# Patient Record
Sex: FEMALE | Race: WHITE | NOT HISPANIC OR LATINO | URBAN - METROPOLITAN AREA
[De-identification: names, ages, dates, MRNs, and addresses within clinical notes are randomized per-mention and may not be internally consistent; named-entity substitution may affect disease eponyms.]

---

## 2017-11-23 ENCOUNTER — EMERGENCY (EMERGENCY)
Facility: HOSPITAL | Age: 37
LOS: 1 days | Discharge: HOME | End: 2017-11-23
Attending: EMERGENCY MEDICINE | Admitting: EMERGENCY MEDICINE
Payer: COMMERCIAL

## 2017-11-23 VITALS
SYSTOLIC BLOOD PRESSURE: 126 MMHG | RESPIRATION RATE: 16 BRPM | HEART RATE: 84 BPM | DIASTOLIC BLOOD PRESSURE: 87 MMHG | OXYGEN SATURATION: 100 %

## 2017-11-23 LAB
ALBUMIN SERPL ELPH-MCNC: 4.1 G/DL — SIGNIFICANT CHANGE UP (ref 3.3–5)
ALP SERPL-CCNC: 51 U/L — SIGNIFICANT CHANGE UP (ref 40–120)
ALT FLD-CCNC: 17 U/L RC — SIGNIFICANT CHANGE UP (ref 10–45)
ANION GAP SERPL CALC-SCNC: 13 MMOL/L — SIGNIFICANT CHANGE UP (ref 5–17)
APPEARANCE UR: CLEAR — SIGNIFICANT CHANGE UP
AST SERPL-CCNC: 20 U/L — SIGNIFICANT CHANGE UP (ref 10–40)
BACTERIA # UR AUTO: ABNORMAL /HPF
BASOPHILS # BLD AUTO: 0 K/UL — SIGNIFICANT CHANGE UP (ref 0–0.2)
BASOPHILS NFR BLD AUTO: 0.2 % — SIGNIFICANT CHANGE UP (ref 0–2)
BILIRUB SERPL-MCNC: 0.2 MG/DL — SIGNIFICANT CHANGE UP (ref 0.2–1.2)
BILIRUB UR-MCNC: NEGATIVE — SIGNIFICANT CHANGE UP
BUN SERPL-MCNC: 7 MG/DL — SIGNIFICANT CHANGE UP (ref 7–23)
CALCIUM SERPL-MCNC: 9.2 MG/DL — SIGNIFICANT CHANGE UP (ref 8.4–10.5)
CHLORIDE SERPL-SCNC: 103 MMOL/L — SIGNIFICANT CHANGE UP (ref 96–108)
CO2 SERPL-SCNC: 24 MMOL/L — SIGNIFICANT CHANGE UP (ref 22–31)
COLOR SPEC: COLORLESS — SIGNIFICANT CHANGE UP
CREAT SERPL-MCNC: 0.54 MG/DL — SIGNIFICANT CHANGE UP (ref 0.5–1.3)
DIFF PNL FLD: NEGATIVE — SIGNIFICANT CHANGE UP
EOSINOPHIL # BLD AUTO: 0.3 K/UL — SIGNIFICANT CHANGE UP (ref 0–0.5)
EOSINOPHIL NFR BLD AUTO: 2.7 % — SIGNIFICANT CHANGE UP (ref 0–6)
EPI CELLS # UR: SIGNIFICANT CHANGE UP /HPF
GLUCOSE SERPL-MCNC: 92 MG/DL — SIGNIFICANT CHANGE UP (ref 70–99)
GLUCOSE UR QL: NEGATIVE — SIGNIFICANT CHANGE UP
HCG SERPL-ACNC: SIGNIFICANT CHANGE UP MIU/ML
HCT VFR BLD CALC: 34.7 % — SIGNIFICANT CHANGE UP (ref 34.5–45)
HGB BLD-MCNC: 12.3 G/DL — SIGNIFICANT CHANGE UP (ref 11.5–15.5)
HYALINE CASTS # UR AUTO: ABNORMAL
KETONES UR-MCNC: NEGATIVE — SIGNIFICANT CHANGE UP
LEUKOCYTE ESTERASE UR-ACNC: NEGATIVE — SIGNIFICANT CHANGE UP
LYMPHOCYTES # BLD AUTO: 2.3 K/UL — SIGNIFICANT CHANGE UP (ref 1–3.3)
LYMPHOCYTES # BLD AUTO: 23.4 % — SIGNIFICANT CHANGE UP (ref 13–44)
MCHC RBC-ENTMCNC: 34.2 PG — HIGH (ref 27–34)
MCHC RBC-ENTMCNC: 35.3 GM/DL — SIGNIFICANT CHANGE UP (ref 32–36)
MCV RBC AUTO: 96.8 FL — SIGNIFICANT CHANGE UP (ref 80–100)
MONOCYTES # BLD AUTO: 0.5 K/UL — SIGNIFICANT CHANGE UP (ref 0–0.9)
MONOCYTES NFR BLD AUTO: 5.6 % — SIGNIFICANT CHANGE UP (ref 2–14)
NEUTROPHILS # BLD AUTO: 6.6 K/UL — SIGNIFICANT CHANGE UP (ref 1.8–7.4)
NEUTROPHILS NFR BLD AUTO: 68.1 % — SIGNIFICANT CHANGE UP (ref 43–77)
NITRITE UR-MCNC: NEGATIVE — SIGNIFICANT CHANGE UP
PH UR: 6.5 — SIGNIFICANT CHANGE UP (ref 5–8)
PLATELET # BLD AUTO: 212 K/UL — SIGNIFICANT CHANGE UP (ref 150–400)
POTASSIUM SERPL-MCNC: 3.4 MMOL/L — LOW (ref 3.5–5.3)
POTASSIUM SERPL-SCNC: 3.4 MMOL/L — LOW (ref 3.5–5.3)
PROT SERPL-MCNC: 6.9 G/DL — SIGNIFICANT CHANGE UP (ref 6–8.3)
PROT UR-MCNC: NEGATIVE — SIGNIFICANT CHANGE UP
RBC # BLD: 3.58 M/UL — LOW (ref 3.8–5.2)
RBC # FLD: 11.6 % — SIGNIFICANT CHANGE UP (ref 10.3–14.5)
RBC CASTS # UR COMP ASSIST: SIGNIFICANT CHANGE UP /HPF (ref 0–2)
SODIUM SERPL-SCNC: 140 MMOL/L — SIGNIFICANT CHANGE UP (ref 135–145)
SP GR SPEC: <1.005 — LOW (ref 1.01–1.02)
UROBILINOGEN FLD QL: NEGATIVE — SIGNIFICANT CHANGE UP
WBC # BLD: 9.7 K/UL — SIGNIFICANT CHANGE UP (ref 3.8–10.5)
WBC # FLD AUTO: 9.7 K/UL — SIGNIFICANT CHANGE UP (ref 3.8–10.5)
WBC UR QL: SIGNIFICANT CHANGE UP /HPF (ref 0–5)

## 2017-11-23 PROCEDURE — 81001 URINALYSIS AUTO W/SCOPE: CPT

## 2017-11-23 PROCEDURE — 99284 EMERGENCY DEPT VISIT MOD MDM: CPT | Mod: 25

## 2017-11-23 PROCEDURE — 76705 ECHO EXAM OF ABDOMEN: CPT | Mod: 26

## 2017-11-23 PROCEDURE — 80053 COMPREHEN METABOLIC PANEL: CPT

## 2017-11-23 PROCEDURE — 93005 ELECTROCARDIOGRAM TRACING: CPT

## 2017-11-23 PROCEDURE — 93010 ELECTROCARDIOGRAM REPORT: CPT

## 2017-11-23 PROCEDURE — 84702 CHORIONIC GONADOTROPIN TEST: CPT

## 2017-11-23 PROCEDURE — 85027 COMPLETE CBC AUTOMATED: CPT

## 2017-11-23 PROCEDURE — 76705 ECHO EXAM OF ABDOMEN: CPT

## 2017-11-23 PROCEDURE — 99285 EMERGENCY DEPT VISIT HI MDM: CPT | Mod: 25

## 2017-11-23 PROCEDURE — 76815 OB US LIMITED FETUS(S): CPT | Mod: 26

## 2017-11-23 PROCEDURE — 76815 OB US LIMITED FETUS(S): CPT

## 2017-11-23 NOTE — ED PROVIDER NOTE - CARDIAC, MLM
Normal rate, regular rhythm.  Heart sounds S1, S2.  No murmurs, rubs or gallops. chest wall TTP R chest and sternal but no ecchymosis, seatbelt sign or crepitus.
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
## 2017-11-24 VITALS
TEMPERATURE: 98 F | HEART RATE: 72 BPM | SYSTOLIC BLOOD PRESSURE: 110 MMHG | DIASTOLIC BLOOD PRESSURE: 68 MMHG | OXYGEN SATURATION: 100 % | RESPIRATION RATE: 16 BRPM

## 2020-09-01 NOTE — ED PROVIDER NOTE - CROS ED ENMT ALL NEG
Patient afebrile and had no falls this shift. Fundus firm without massage and below umbilicus. Bleeding light, no clots passed this shift. Voids spontaneously. Ambulates independently. Pain well controlled with IV Toradol. Vital signs stable at this time. Bonding well with infant; responds to infant cues and participates in infant care. Will continue to monitor.      negative...

## 2020-09-23 ENCOUNTER — FORM ENCOUNTER (OUTPATIENT)
Age: 40
End: 2020-09-23

## 2020-09-30 ENCOUNTER — FORM ENCOUNTER (OUTPATIENT)
Age: 40
End: 2020-09-30

## 2020-10-01 ENCOUNTER — APPOINTMENT (OUTPATIENT)
Dept: OBGYN | Facility: CLINIC | Age: 40
End: 2020-10-01
Payer: COMMERCIAL

## 2020-10-01 ENCOUNTER — RESULT REVIEW (OUTPATIENT)
Age: 40
End: 2020-10-01

## 2020-10-01 VITALS — WEIGHT: 143 LBS | HEIGHT: 69 IN | BODY MASS INDEX: 21.18 KG/M2

## 2020-10-01 DIAGNOSIS — Z30.41 ENCOUNTER FOR SURVEILLANCE OF CONTRACEPTIVE PILLS: ICD-10-CM

## 2020-10-01 DIAGNOSIS — Z01.411 ENCOUNTER FOR GYNECOLOGICAL EXAMINATION (GENERAL) (ROUTINE) WITH ABNORMAL FINDINGS: ICD-10-CM

## 2020-10-01 DIAGNOSIS — R92.2 INCONCLUSIVE MAMMOGRAM: ICD-10-CM

## 2020-10-01 DIAGNOSIS — Z12.39 ENCOUNTER FOR OTHER SCREENING FOR MALIGNANT NEOPLASM OF BREAST: ICD-10-CM

## 2020-10-01 PROCEDURE — 99386 PREV VISIT NEW AGE 40-64: CPT

## 2021-03-16 ENCOUNTER — FORM ENCOUNTER (OUTPATIENT)
Age: 41
End: 2021-03-16

## 2021-03-24 ENCOUNTER — FORM ENCOUNTER (OUTPATIENT)
Age: 41
End: 2021-03-24

## 2021-05-08 ENCOUNTER — APPOINTMENT (OUTPATIENT)
Dept: MAMMOGRAPHY | Facility: CLINIC | Age: 41
End: 2021-05-08

## 2021-05-10 PROBLEM — Z00.00 ENCOUNTER FOR PREVENTIVE HEALTH EXAMINATION: Status: ACTIVE | Noted: 2021-05-10

## 2021-05-12 ENCOUNTER — APPOINTMENT (OUTPATIENT)
Dept: MAMMOGRAPHY | Facility: CLINIC | Age: 41
End: 2021-05-12
Payer: COMMERCIAL

## 2021-05-12 ENCOUNTER — RESULT REVIEW (OUTPATIENT)
Age: 41
End: 2021-05-12

## 2021-05-12 PROCEDURE — 77063 BREAST TOMOSYNTHESIS BI: CPT

## 2021-05-12 PROCEDURE — 77067 SCR MAMMO BI INCL CAD: CPT

## 2021-06-28 ENCOUNTER — FORM ENCOUNTER (OUTPATIENT)
Age: 41
End: 2021-06-28

## 2021-08-04 ENCOUNTER — FORM ENCOUNTER (OUTPATIENT)
Age: 41
End: 2021-08-04

## 2021-11-22 ENCOUNTER — RX RENEWAL (OUTPATIENT)
Age: 41
End: 2021-11-22

## 2021-11-23 ENCOUNTER — NON-APPOINTMENT (OUTPATIENT)
Age: 41
End: 2021-11-23

## 2021-11-23 DIAGNOSIS — F32.A ANXIETY DISORDER, UNSPECIFIED: ICD-10-CM

## 2021-11-23 DIAGNOSIS — Z01.419 ENCOUNTER FOR GYNECOLOGICAL EXAMINATION (GENERAL) (ROUTINE) W/OUT ABNORMAL FINDINGS: ICD-10-CM

## 2021-11-23 DIAGNOSIS — Z82.49 FAMILY HISTORY OF ISCHEMIC HEART DISEASE AND OTHER DISEASES OF THE CIRCULATORY SYSTEM: ICD-10-CM

## 2021-11-23 DIAGNOSIS — Z78.9 OTHER SPECIFIED HEALTH STATUS: ICD-10-CM

## 2021-11-23 DIAGNOSIS — F41.9 ANXIETY DISORDER, UNSPECIFIED: ICD-10-CM

## 2021-11-23 NOTE — ED ADULT NURSE NOTE - CAS TRG GEN SKIN CONDITION
Warm/Dry Xerosis Normal Treatment: I recommended application of Cetaphil or CeraVe numerous times a day and before going to bed to all dry areas.

## 2022-01-31 ENCOUNTER — RX RENEWAL (OUTPATIENT)
Age: 42
End: 2022-01-31

## 2022-02-14 ENCOUNTER — APPOINTMENT (OUTPATIENT)
Dept: OBGYN | Facility: CLINIC | Age: 42
End: 2022-02-14
Payer: COMMERCIAL

## 2022-02-14 VITALS
HEIGHT: 69 IN | DIASTOLIC BLOOD PRESSURE: 80 MMHG | BODY MASS INDEX: 18.51 KG/M2 | WEIGHT: 125 LBS | SYSTOLIC BLOOD PRESSURE: 120 MMHG

## 2022-02-14 PROBLEM — Z30.41 ORAL CONTRACEPTIVE USE: Status: ACTIVE | Noted: 2021-11-23

## 2022-02-14 PROBLEM — R92.2 DENSE BREASTS: Status: ACTIVE | Noted: 2022-02-14

## 2022-02-14 PROBLEM — Z12.39 ENCOUNTER FOR BREAST CANCER SCREENING USING NON-MAMMOGRAM MODALITY: Status: ACTIVE | Noted: 2022-02-14

## 2022-02-14 PROBLEM — Z01.411 ENCOUNTER FOR WELL WOMAN EXAM WITH ABNORMAL FINDINGS: Status: ACTIVE | Noted: 2022-02-14

## 2022-02-14 PROCEDURE — 99213 OFFICE O/P EST LOW 20 MIN: CPT | Mod: 25

## 2022-02-14 PROCEDURE — 99396 PREV VISIT EST AGE 40-64: CPT

## 2022-02-14 NOTE — REASON FOR VISIT
[Annual] : an annual visit. [Abnormal Uterine Bleeding] : abnormal uterine bleeding [Other: _____] : [unfilled]

## 2022-02-14 NOTE — END OF VISIT
[FreeTextEntry3] : Documented by Wen Plunkett acting as scribe for Peyton Wyatt NP on 11/29/2021.\par \par All Medical record entries made by the Scribe were at my, Peyton Wyatt NP's, direction and personally dictated by me on 11/29/2021  . I have reviewed the chart and agree that the record accurately reflects my personal performance of the history, physical exam, assessment and plan. I have also personally directed, reviewed, and agreed with the discharge instructions.

## 2022-02-14 NOTE — PLAN
[FreeTextEntry1] : ROUTINE GYN\par -switch OCP to Lo Loestrin, discussed that there may be some irregularities for a few months after switching. If there is still irregular bleeding after 3 months then FU\par -Discussed and reviewed importance of monthly BSE\par -Declines STI testing, importance of safe sexual practices discussed\par -PAP/HPV test collected and sent at today's visit\par -RX mammo/sono given to pt with locations and instructions\par -Osteoperosis prevention; recc. vitd/calcium supplementation and WBE to maintain bone density; start DEXA >65-f/u PCP for recommended HCM, vaccinations and CA screening\par \par ABNML MENSES\par -switch OCP to Lo Loestrin-has been on prior ocp for years, d b/r/se, discussed that there may be some irregularities for a few months after switching. If there is still irregular bleeding after 3 months then FU\par -if continue to be abnormal on new ocp-rx for pelvic sono\par \par ANXIETY/DEPRESSION\par -d/w pt better options than triphasic ocps-may worses anxirty dep; changed to loloestrin fe\par -referral given today to therapist Ashley Medellin; LSW\par -no si/hi\par \par CANCER SCREENING\par - SEM[facility] written materials and information provided to pt today; very concerned about CA; denies her side of the family with CA but her husbands side does have sig + hx; she wants to learn more about her risk/prevention\par -advised to sced consult with JR/[facility] to discuss further. \par \par During this visit 30 minutes were spent face-to-face with greater than 50% of this time dedicated to counseling.\par

## 2022-02-14 NOTE — COUNSELING
[Nutrition/ Exercise/ Weight Management] : nutrition, exercise, weight management [Vitamins/Supplements] : vitamins/supplements [Breast Self Exam] : breast self exam [Contraception/ Emergency Contraception/ Safe Sexual Practices] : contraception, emergency contraception, safe sexual practices [Confidentiality] : confidentiality [STD (testing, results, tx)] : STD (testing, results, tx) [Vaccines] : vaccines [Other ___] : [unfilled]

## 2022-02-14 NOTE — REVIEW OF SYSTEMS
[Negative] : Heme/Lymph [Patient Intake Form Reviewed] : Patient intake form was reviewed [Abn Vaginal bleeding] : abnormal vaginal bleeding [Anxiety] : anxiety [Depression] : depression [Sleep Disturbances] : sleep disturbances

## 2022-02-14 NOTE — HISTORY OF PRESENT ILLNESS
[Patient reported mammogram was normal] : Patient reported mammogram was normal [Patient reported PAP Smear was normal] : Patient reported PAP Smear was normal [N] : Patient reports normal menses [Y] : Positive pregnancy history [Mammogramdate] : 5/2021 [PapSmeardate] : 10/2020 [LMPDate] : 1/15/22 [MensesFreq] : 28 [PGHxTotal] : 3 [[facility] : 2 [PGHxAbortions] : 1 [[facility]] : 2 [PGHxABSpont] : 1 [Irregular Menstrual Interval] : irregular menstrual interval [Light Bleeding] : light bleeding [Normal Amount/Duration] :  normal amount and duration [FreeTextEntry1] : 1/15/22 [No] : Patient does not have concerns regarding sex [Currently Active] : currently active [Men] : men [Patient refuses STI testing] : Patient refuses STI testing

## 2022-02-15 LAB — HPV HIGH+LOW RISK DNA PNL CVX: NOT DETECTED

## 2022-02-19 LAB — CYTOLOGY CVX/VAG DOC THIN PREP: NORMAL

## 2022-04-11 ENCOUNTER — APPOINTMENT (OUTPATIENT)
Dept: OBGYN | Facility: CLINIC | Age: 42
End: 2022-04-11
Payer: COMMERCIAL

## 2022-04-11 VITALS
BODY MASS INDEX: 18.22 KG/M2 | HEART RATE: 75 BPM | RESPIRATION RATE: 14 BRPM | WEIGHT: 123 LBS | OXYGEN SATURATION: 100 % | DIASTOLIC BLOOD PRESSURE: 75 MMHG | SYSTOLIC BLOOD PRESSURE: 120 MMHG | HEIGHT: 69 IN

## 2022-04-11 DIAGNOSIS — N39.0 URINARY TRACT INFECTION, SITE NOT SPECIFIED: ICD-10-CM

## 2022-04-11 PROCEDURE — 36415 COLL VENOUS BLD VENIPUNCTURE: CPT

## 2022-04-11 PROCEDURE — 99213 OFFICE O/P EST LOW 20 MIN: CPT

## 2022-04-11 NOTE — PHYSICAL EXAM
[Chaperone Declined] : Patient declined chaperone [Appropriately responsive] : appropriately responsive [Alert] : alert [No Acute Distress] : no acute distress [Oriented x3] : oriented x3 [FreeTextEntry4] : Color pink, no distress, [FreeTextEntry5] : Resp. rate wnl, color pink, no SOB

## 2022-04-11 NOTE — HISTORY OF PRESENT ILLNESS
[FreeTextEntry1] : 41yo presents for hereditary cancer screening. \par Pt. has no family history of cancer but is curious to have testing. Pt. would like to have  tested.\par

## 2022-04-11 NOTE — PLAN
[FreeTextEntry1] : I discussed patient's and family history in detail and counseled her on testing that analyzes genes associated with specific hereditary cancer risks. I explained to the patient that results may be positive, negative or showing a variant of uncertain significance, as well as the possible significance of these results. Patient understands she will need to come to office for follow up visit if results are positive for genes associated with increased cancer risk. We also discussed the benefits, risks and limitations of the [facility] 4 Genetics panel testing. Patient understands that knowing her risk of certain cancers based on genetic information will allow for management changes that can prevent or reduce risk of cancer. Such management changes will be fully discussed when results are available. Questions were answered, consents signed. Blood sent to lab.\par Blood sent to [facility] 4\par High prevalence panel\par \par \par

## 2022-04-11 NOTE — PLAN
[FreeTextEntry1] : I discussed patient's and family history in detail and counseled her on testing that analyzes genes associated with specific hereditary cancer risks. I explained to the patient that results may be positive, negative or showing a variant of uncertain significance, as well as the possible significance of these results. Patient understands she will need to come to office for follow up visit if results are positive for genes associated with increased cancer risk. We also discussed the benefits, risks and limitations of the [facility] 4 Genetics panel testing. Patient understands that knowing her risk of certain cancers based on genetic information will allow for management changes that can prevent or reduce risk of cancer. Such management changes will be fully discussed when results are available. Questions were answered, consents signed. Blood sent to lab.\par Blood sent to [facility] 4\par High prevalence panel\par \par \par

## 2022-07-01 ENCOUNTER — NON-APPOINTMENT (OUTPATIENT)
Age: 42
End: 2022-07-01

## 2022-07-01 DIAGNOSIS — Z78.9 OTHER SPECIFIED HEALTH STATUS: ICD-10-CM

## 2022-07-01 DIAGNOSIS — Z87.59 PERSONAL HISTORY OF OTHER COMPLICATIONS OF PREGNANCY, CHILDBIRTH AND THE PUERPERIUM: ICD-10-CM

## 2022-09-07 PROBLEM — N39.0 ACUTE UTI: Status: RESOLVED | Noted: 2022-09-07 | Resolved: 2022-10-07

## 2022-09-09 RX ORDER — NITROFURANTOIN (MONOHYDRATE/MACROCRYSTALS) 25; 75 MG/1; MG/1
100 CAPSULE ORAL
Qty: 14 | Refills: 0 | Status: COMPLETED | COMMUNITY
Start: 2022-09-08 | End: 2022-09-15

## 2022-10-04 ENCOUNTER — RX RENEWAL (OUTPATIENT)
Age: 42
End: 2022-10-04

## 2023-02-08 ENCOUNTER — RESULT REVIEW (OUTPATIENT)
Age: 43
End: 2023-02-08

## 2023-02-08 ENCOUNTER — APPOINTMENT (OUTPATIENT)
Dept: ULTRASOUND IMAGING | Facility: HOSPITAL | Age: 43
End: 2023-02-08
Payer: COMMERCIAL

## 2023-02-08 ENCOUNTER — APPOINTMENT (OUTPATIENT)
Dept: MAMMOGRAPHY | Facility: HOSPITAL | Age: 43
End: 2023-02-08
Payer: COMMERCIAL

## 2023-02-08 ENCOUNTER — OUTPATIENT (OUTPATIENT)
Dept: OUTPATIENT SERVICES | Facility: HOSPITAL | Age: 43
LOS: 1 days | End: 2023-02-08
Payer: COMMERCIAL

## 2023-02-08 DIAGNOSIS — R92.2 INCONCLUSIVE MAMMOGRAM: ICD-10-CM

## 2023-02-08 PROCEDURE — 77065 DX MAMMO INCL CAD UNI: CPT | Mod: 26,GG,RT

## 2023-02-08 PROCEDURE — 77063 BREAST TOMOSYNTHESIS BI: CPT | Mod: 26,59

## 2023-02-08 PROCEDURE — 77067 SCR MAMMO BI INCL CAD: CPT | Mod: 26,59

## 2023-02-08 PROCEDURE — 77067 SCR MAMMO BI INCL CAD: CPT

## 2023-02-08 PROCEDURE — 77065 DX MAMMO INCL CAD UNI: CPT

## 2023-02-08 PROCEDURE — 77063 BREAST TOMOSYNTHESIS BI: CPT

## 2023-04-12 ENCOUNTER — RX RENEWAL (OUTPATIENT)
Age: 43
End: 2023-04-12

## 2023-05-09 ENCOUNTER — APPOINTMENT (OUTPATIENT)
Dept: OBGYN | Facility: CLINIC | Age: 43
End: 2023-05-09
Payer: COMMERCIAL

## 2023-05-09 VITALS
DIASTOLIC BLOOD PRESSURE: 84 MMHG | BODY MASS INDEX: 18.22 KG/M2 | WEIGHT: 123 LBS | SYSTOLIC BLOOD PRESSURE: 127 MMHG | HEIGHT: 69 IN

## 2023-05-09 DIAGNOSIS — Z12.4 ENCOUNTER FOR SCREENING FOR MALIGNANT NEOPLASM OF CERVIX: ICD-10-CM

## 2023-05-09 DIAGNOSIS — Z11.51 ENCOUNTER FOR SCREENING FOR HUMAN PAPILLOMAVIRUS (HPV): ICD-10-CM

## 2023-05-09 DIAGNOSIS — Z01.419 ENCOUNTER FOR GYNECOLOGICAL EXAMINATION (GENERAL) (ROUTINE) W/OUT ABNORMAL FINDINGS: ICD-10-CM

## 2023-05-09 DIAGNOSIS — R39.9 UNSPECIFIED SYMPTOMS AND SIGNS INVOLVING THE GENITOURINARY SYSTEM: ICD-10-CM

## 2023-05-09 DIAGNOSIS — Z30.41 ENCOUNTER FOR SURVEILLANCE OF CONTRACEPTIVE PILLS: ICD-10-CM

## 2023-05-09 PROCEDURE — 99396 PREV VISIT EST AGE 40-64: CPT

## 2023-05-09 RX ORDER — NORGESTIMATE AND ETHINYL ESTRADIOL 7DAYSX3 LO
0.18/0.215/0.25 KIT ORAL
Qty: 1 | Refills: 1 | Status: DISCONTINUED | COMMUNITY
Start: 2021-11-22 | End: 2023-05-09

## 2023-05-09 NOTE — PLAN
[FreeTextEntry1] : Routine GYN Exam\par -Discussed and reviewed importance of monthly BSE\par -Declines STI testing, importance safe sexual practices discussed\par -Pap/HPV test collected and sent at todays visit\par -UTD mammo/sono; due 2/2024 \par -Osteoporosis prevention; recc. vitd/Calcium supplementation and WBE to maintain bone density; start DEXA >6\par -f/u PCP for recommended HCM, vaccinations and CA screening\par -ucx done today per pt request\par -refilled loloestrin fe, Oral contraceptive counseling provided to the patient.  Discussed risks and benefits, including thromboembolic events, especially in the setting of smoking, or in the setting of pre-existing medical conditions that predispose to adverse cardiovascular events.  Benign side effects reviewed as well as risk of unintended pregnancy.   Discussion regarding decreased contraceptive efficacy when taken with certain medications or when dosing schedule is erratic.  They do not protect against STI's. All questions answered.\par \par \par rto 1 yr or sooner as needed\par

## 2023-05-09 NOTE — COUNSELING
[Nutrition/ Exercise/ Weight Management] : nutrition, exercise, weight management [Body Image] : body image [Vitamins/Supplements] : vitamins/supplements [Breast Self Exam] : breast self exam [Contraception/ Emergency Contraception/ Safe Sexual Practices] : contraception, emergency contraception, safe sexual practices [Confidentiality] : confidentiality [STD (testing, results, tx)] : STD (testing, results, tx) [HPV Vaccine] : HPV Vaccine [Lab Results] : lab results [Medication Management] : medication management

## 2023-05-09 NOTE — HISTORY OF PRESENT ILLNESS
[FreeTextEntry1] : 43 year old  LMP 23 (light to no menses on lo loestrin ocp), neg ucg today in office.   presents today for an annual exam. Denies PSHx. h/o anxiety and depression-no meds. She had two deliveries in  and . She had a spontaneous miscarriage at 12 weeks, no D&C. Denies smoking, alcohol or illicit drugs. Denies any FMHx of CA. Had hereditary CA screening done with [facility] 2022. neg. c/o "uti symptoms" on and off over the last few months. -back pain, N/V/F/C. \par \par NKDA\par \par mammo 2023 BIRADS 2 \par \par offered and declines STI testing.

## 2023-05-09 NOTE — REVIEW OF SYSTEMS
[Patient Intake Form Reviewed] : Patient intake form was reviewed [Dysuria] : dysuria [Negative] : Heme/Lymph

## 2023-05-10 LAB — HPV HIGH+LOW RISK DNA PNL CVX: NOT DETECTED

## 2023-05-11 LAB — BACTERIA UR CULT: NORMAL

## 2023-05-15 LAB — CYTOLOGY CVX/VAG DOC THIN PREP: NORMAL

## 2023-08-28 DIAGNOSIS — F41.9 ANXIETY DISORDER, UNSPECIFIED: ICD-10-CM

## 2023-08-28 RX ORDER — DIAZEPAM 5 MG/1
5 TABLET ORAL AS DIRECTED
Qty: 2 | Refills: 0 | Status: ACTIVE | COMMUNITY
Start: 2023-08-28 | End: 1900-01-01

## 2024-03-13 ENCOUNTER — RX RENEWAL (OUTPATIENT)
Age: 44
End: 2024-03-13

## 2024-04-04 DIAGNOSIS — F32.A ANXIETY DISORDER, UNSPECIFIED: ICD-10-CM

## 2024-04-04 DIAGNOSIS — F41.9 ANXIETY DISORDER, UNSPECIFIED: ICD-10-CM

## 2024-06-04 ENCOUNTER — APPOINTMENT (OUTPATIENT)
Dept: OBGYN | Facility: CLINIC | Age: 44
End: 2024-06-04

## 2024-06-24 ENCOUNTER — RX RENEWAL (OUTPATIENT)
Age: 44
End: 2024-06-24

## 2024-06-24 RX ORDER — ESCITALOPRAM OXALATE 10 MG/1
10 TABLET ORAL DAILY
Qty: 90 | Refills: 2 | Status: ACTIVE | COMMUNITY
Start: 2024-04-04 | End: 1900-01-01

## 2024-09-16 ENCOUNTER — APPOINTMENT (OUTPATIENT)
Dept: OBGYN | Facility: CLINIC | Age: 44
End: 2024-09-16
Payer: COMMERCIAL

## 2024-09-16 VITALS
DIASTOLIC BLOOD PRESSURE: 73 MMHG | HEIGHT: 69 IN | WEIGHT: 123 LBS | HEART RATE: 71 BPM | BODY MASS INDEX: 18.22 KG/M2 | SYSTOLIC BLOOD PRESSURE: 127 MMHG

## 2024-09-16 DIAGNOSIS — Z30.41 ENCOUNTER FOR SURVEILLANCE OF CONTRACEPTIVE PILLS: ICD-10-CM

## 2024-09-16 DIAGNOSIS — F32.A ANXIETY DISORDER, UNSPECIFIED: ICD-10-CM

## 2024-09-16 DIAGNOSIS — R92.30 DENSE BREASTS, UNSPECIFIED: ICD-10-CM

## 2024-09-16 DIAGNOSIS — Z12.39 ENCOUNTER FOR OTHER SCREENING FOR MALIGNANT NEOPLASM OF BREAST: ICD-10-CM

## 2024-09-16 DIAGNOSIS — Z12.4 ENCOUNTER FOR SCREENING FOR MALIGNANT NEOPLASM OF CERVIX: ICD-10-CM

## 2024-09-16 DIAGNOSIS — F41.9 ANXIETY DISORDER, UNSPECIFIED: ICD-10-CM

## 2024-09-16 DIAGNOSIS — Z11.51 ENCOUNTER FOR SCREENING FOR HUMAN PAPILLOMAVIRUS (HPV): ICD-10-CM

## 2024-09-16 PROCEDURE — 99396 PREV VISIT EST AGE 40-64: CPT

## 2024-09-16 NOTE — PLAN
[FreeTextEntry1] : Routine GYN Exam -Discussed and reviewed importance of monthly BSE -Declines STI testing, importance safe sexual practices discussed -Pap/HPV test collected and sent at todays visit -RX mammo/sono given to pt with locations and instructions -Osteoporosis prevention; recc. vitd/Calcium supplementation and WBE to maintain bone density; start DEXA >65 -f/u PCP for recommended HCM, vaccinations and CA screening -refilled loloestrein fe, Oral contraceptive counseling provided to the patient.  Discussed risks and benefits, including thromboembolic events, especially in the setting of smoking, or in the setting of pre-existing medical conditions that predispose to adverse cardiovascular events.  Benign side effects reviewed as well as risk of unintended pregnancy.   Discussion regarding decreased contraceptive efficacy when taken with certain medications or when dosing schedule is erratic.  They do not protect against STI's. All questions answered.  Depression -Expresses that all of her depressive symptoms are directly related to her  dx of ALS -PHQ 2 score of 6, PHQ9 score of 16. Reviewed with pt today.  -Feeling sad and hopeless today bur denies SI/HI.  -seeing Psychologist that she feels is helping her -Psychiatry referral placed today in system -offered Devika nurse  with pt today, pt declines, does not want to talk about it again today -asking for refill of Lexapro, i gave her refill today but advised pt she must follow up with Psychiatry    rto 1 yr or sooner as needed.

## 2024-09-17 LAB — HPV HIGH+LOW RISK DNA PNL CVX: NOT DETECTED

## 2024-09-23 LAB — CYTOLOGY CVX/VAG DOC THIN PREP: NORMAL

## 2024-11-18 ENCOUNTER — APPOINTMENT (OUTPATIENT)
Dept: ULTRASOUND IMAGING | Facility: HOSPITAL | Age: 44
End: 2024-11-18

## 2024-11-18 ENCOUNTER — APPOINTMENT (OUTPATIENT)
Dept: MAMMOGRAPHY | Facility: HOSPITAL | Age: 44
End: 2024-11-18

## 2025-09-15 ENCOUNTER — TRANSCRIPTION ENCOUNTER (OUTPATIENT)
Age: 45
End: 2025-09-15

## 2025-09-16 ENCOUNTER — TRANSCRIPTION ENCOUNTER (OUTPATIENT)
Age: 45
End: 2025-09-16